# Patient Record
Sex: FEMALE | Race: OTHER | HISPANIC OR LATINO | Employment: FULL TIME | URBAN - METROPOLITAN AREA
[De-identification: names, ages, dates, MRNs, and addresses within clinical notes are randomized per-mention and may not be internally consistent; named-entity substitution may affect disease eponyms.]

---

## 2018-02-16 ENCOUNTER — OFFICE VISIT (OUTPATIENT)
Dept: FAMILY MEDICINE CLINIC | Facility: CLINIC | Age: 53
End: 2018-02-16
Payer: COMMERCIAL

## 2018-02-16 VITALS
SYSTOLIC BLOOD PRESSURE: 118 MMHG | TEMPERATURE: 98.3 F | OXYGEN SATURATION: 99 % | WEIGHT: 134 LBS | DIASTOLIC BLOOD PRESSURE: 70 MMHG | RESPIRATION RATE: 16 BRPM | BODY MASS INDEX: 21.53 KG/M2 | HEART RATE: 108 BPM | HEIGHT: 66 IN

## 2018-02-16 DIAGNOSIS — N30.00 ACUTE CYSTITIS WITHOUT HEMATURIA: ICD-10-CM

## 2018-02-16 DIAGNOSIS — R30.0 DYSURIA: Primary | ICD-10-CM

## 2018-02-16 PROBLEM — N39.0 UTI (URINARY TRACT INFECTION): Status: ACTIVE | Noted: 2018-02-16

## 2018-02-16 PROBLEM — N94.9 VAGINAL LUMP: Status: ACTIVE | Noted: 2018-02-16

## 2018-02-16 LAB
BACTERIA UR QL AUTO: ABNORMAL /HPF
BILIRUB UR QL STRIP: NEGATIVE
CLARITY UR: ABNORMAL
COLOR UR: YELLOW
GLUCOSE UR STRIP-MCNC: NEGATIVE MG/DL
HGB UR QL STRIP.AUTO: ABNORMAL
KETONES UR STRIP-MCNC: NEGATIVE MG/DL
LEUKOCYTE ESTERASE UR QL STRIP: ABNORMAL
NITRITE UR QL STRIP: NEGATIVE
NON-SQ EPI CELLS URNS QL MICRO: ABNORMAL /HPF
PH UR STRIP.AUTO: 5.5 [PH] (ref 4.5–8)
PROT UR STRIP-MCNC: ABNORMAL MG/DL
RBC #/AREA URNS AUTO: ABNORMAL /HPF
SL AMB  POCT GLUCOSE, UA: ABNORMAL
SL AMB LEUKOCYTE ESTERASE,UA: ABNORMAL
SL AMB POCT BILIRUBIN,UA: ABNORMAL
SL AMB POCT BLOOD,UA: ABNORMAL
SL AMB POCT CLARITY,UA: ABNORMAL
SL AMB POCT COLOR,UA: YELLOW
SL AMB POCT KETONES,UA: ABNORMAL
SL AMB POCT NITRITE,UA: ABNORMAL
SL AMB POCT PH,UA: 6
SL AMB POCT SPECIFIC GRAVITY,UA: 1.03
SL AMB POCT URINE PROTEIN: ABNORMAL
SL AMB POCT UROBILINOGEN: ABNORMAL
SP GR UR STRIP.AUTO: 1.02 (ref 1–1.03)
UROBILINOGEN UR QL STRIP.AUTO: 0.2 E.U./DL
WBC #/AREA URNS AUTO: ABNORMAL /HPF

## 2018-02-16 PROCEDURE — 81001 URINALYSIS AUTO W/SCOPE: CPT | Performed by: FAMILY MEDICINE

## 2018-02-16 PROCEDURE — 81002 URINALYSIS NONAUTO W/O SCOPE: CPT | Performed by: FAMILY MEDICINE

## 2018-02-16 PROCEDURE — 99214 OFFICE O/P EST MOD 30 MIN: CPT | Performed by: FAMILY MEDICINE

## 2018-02-16 PROCEDURE — 87086 URINE CULTURE/COLONY COUNT: CPT | Performed by: FAMILY MEDICINE

## 2018-02-16 RX ORDER — GLUCOSAMINE HCL 500 MG
TABLET ORAL
COMMUNITY
Start: 2016-05-11 | End: 2018-02-21 | Stop reason: ALTCHOICE

## 2018-02-16 RX ORDER — IBUPROFEN 800 MG/1
800 TABLET ORAL EVERY 6 HOURS PRN
COMMUNITY
End: 2018-03-20 | Stop reason: HOSPADM

## 2018-02-16 RX ORDER — NAPROXEN SODIUM 220 MG
TABLET ORAL
COMMUNITY
End: 2018-02-21 | Stop reason: ALTCHOICE

## 2018-02-16 RX ORDER — FAMOTIDINE 20 MG/1
TABLET, FILM COATED ORAL
COMMUNITY
Start: 2017-12-12 | End: 2018-03-20 | Stop reason: HOSPADM

## 2018-02-16 RX ORDER — CIPROFLOXACIN 500 MG/1
500 TABLET, FILM COATED ORAL EVERY 12 HOURS SCHEDULED
Qty: 14 TABLET | Refills: 0 | Status: SHIPPED | OUTPATIENT
Start: 2018-02-16 | End: 2018-02-23

## 2018-02-16 RX ORDER — MULTIVITAMIN
TABLET ORAL
COMMUNITY
End: 2018-02-21 | Stop reason: ALTCHOICE

## 2018-02-16 NOTE — PROGRESS NOTES
Assessment/Plan:    1 UTI - urine dipstick is positive for leukocytes 3 +, positive for blood  Will send urine for U/A and urine culture  Start Cipro 500 mg twice daily for 1 week  Recommended to increase fluid intake  2 Vaginal lump - r/o abscess versus cyst   Patient will start Abx  Recommended warm sitz bath  Will reevaluate next week and refer to gynecologist if no improvement  Diagnoses and all orders for this visit:    Dysuria  -     POCT urine dip  -     UA w Reflex to Microscopic w Reflex to Culture - Clinic Collect; Future          Subjective:      Patient ID: Supa Velazco is a 47 y o  female  HPI     Patient presents to the office complaining urinary frequency, discomfort with urination started 5 days ago  She increased fluid intake with some improvement in symptoms  Denies blood in urine  No back pain, no abdominal pain  No fever or chills  C/o lump  in vaginal area which she developed 5 days ago  Denies vaginal spotting or bleeding  Patient has not been seen by gynecologist recently  The following portions of the patient's history were reviewed and updated as appropriate: allergies, current medications, past family history, past medical history, past social history, past surgical history and problem list     Review of Systems   Constitutional: Negative for activity change, appetite change, chills, fatigue and fever  HENT: Negative  Respiratory: Negative for cough, chest tightness, shortness of breath and wheezing  Cardiovascular: Negative for chest pain, palpitations and leg swelling  Gastrointestinal: Positive for constipation (occasional )  Negative for abdominal distention, abdominal pain, blood in stool, diarrhea, nausea, rectal pain and vomiting  Genitourinary: Positive for dysuria and frequency  Negative for difficulty urinating, hematuria, vaginal bleeding and vaginal discharge          Vaginal lump   Musculoskeletal: Positive for arthralgias (L knee pain)  Negative for gait problem, joint swelling and myalgias  Skin: Negative for rash and wound  Neurological: Negative for dizziness and headaches  Hematological: Negative  Psychiatric/Behavioral: Negative  Objective:      /70 (BP Location: Left arm, Patient Position: Sitting, Cuff Size: Adult)   Pulse (!) 108   Temp 98 3 °F (36 8 °C) (Tympanic)   Resp 16   Ht 5' 5 5" (1 664 m)   Wt 60 8 kg (134 lb)   SpO2 99%   BMI 21 96 kg/m²          Physical Exam   Constitutional: She appears well-developed and well-nourished  Cardiovascular: Normal rate, regular rhythm and normal heart sounds  No murmur heard  Pulmonary/Chest: Effort normal and breath sounds normal  She has no wheezes  She has no rales  Abdominal: Soft  Bowel sounds are normal  There is no tenderness  Genitourinary: No vaginal discharge found  Genitourinary Comments: Palpable tender lump 1 5 x 1 5 cm in R lower vaginal area  No drainage  Musculoskeletal: Normal range of motion  She exhibits no edema or deformity  Skin: Skin is warm and dry  No rash noted  Psychiatric: She has a normal mood and affect  Nursing note and vitals reviewed

## 2018-02-18 LAB — BACTERIA UR CULT: ABNORMAL

## 2018-02-20 ENCOUNTER — OFFICE VISIT (OUTPATIENT)
Dept: FAMILY MEDICINE CLINIC | Facility: CLINIC | Age: 53
End: 2018-02-20
Payer: COMMERCIAL

## 2018-02-20 VITALS
DIASTOLIC BLOOD PRESSURE: 60 MMHG | RESPIRATION RATE: 16 BRPM | HEIGHT: 66 IN | WEIGHT: 135.8 LBS | SYSTOLIC BLOOD PRESSURE: 102 MMHG | TEMPERATURE: 98 F | HEART RATE: 60 BPM | BODY MASS INDEX: 21.83 KG/M2

## 2018-02-20 DIAGNOSIS — N94.9 VAGINAL LUMP: ICD-10-CM

## 2018-02-20 DIAGNOSIS — N30.00 ACUTE CYSTITIS WITHOUT HEMATURIA: Primary | ICD-10-CM

## 2018-02-20 PROCEDURE — 99213 OFFICE O/P EST LOW 20 MIN: CPT | Performed by: FAMILY MEDICINE

## 2018-02-20 PROCEDURE — 3008F BODY MASS INDEX DOCD: CPT | Performed by: FAMILY MEDICINE

## 2018-02-20 NOTE — ASSESSMENT & PLAN NOTE
R/o vaginal cyst   Referred to gynecologist for further evaluation  Appointment scheduled with gynecology for tomorrow

## 2018-02-20 NOTE — PROGRESS NOTES
Assessment/Plan:      UTI (urinary tract infection)  Symptoms improved  Complete Abx with Cipro as prescribed  Vaginal lump  R/o vaginal cyst   Referred to gynecologist for further evaluation  Appointment scheduled with gynecology for tomorrow  Diagnoses and all orders for this visit:    Acute cystitis without hematuria    Vaginal lump  -     Ambulatory referral to Gynecology; Future        Subjective:      Patient ID: Veronica Short is a 47 y o  female  HPI     Patient presents for follow-up for UTI, vaginal lump  She was seen in the office on February 16, 2018  She was started on Ciprofloxacin 500 mg twice daily for 1 week  Patient has 5 more tablets to finished antibiotic therapy  Denies urinary symptoms  No blood in urine  No vaginal discharge or spotting  Patient still feels a lump in the right vaginal area  Denies fever or chills    The following portions of the patient's history were reviewed and updated as appropriate: allergies, current medications, past family history, past medical history, past social history, past surgical history and problem list     Review of Systems   Constitutional: Negative for activity change, chills, fatigue and fever  Respiratory: Negative for cough and shortness of breath  Cardiovascular: Negative for chest pain, palpitations and leg swelling  Gastrointestinal: Negative for abdominal pain, blood in stool, constipation, diarrhea, nausea and vomiting  Genitourinary: Negative for difficulty urinating, dysuria, frequency, hematuria, vaginal bleeding and vaginal discharge  Lump in R vaginal area   Musculoskeletal: Positive for arthralgias (L knee pain)  Negative for joint swelling  Skin: Negative for rash and wound  Neurological: Negative for dizziness and headaches  Hematological: Negative  Psychiatric/Behavioral: Negative            Objective:      /60 (BP Location: Left arm, Patient Position: Sitting, Cuff Size: Adult) Pulse 60   Temp 98 °F (36 7 °C) (Tympanic)   Resp 16   Ht 5' 5 5" (1 664 m)   Wt 61 6 kg (135 lb 12 8 oz)   BMI 22 25 kg/m²          Physical Exam   Constitutional: She appears well-developed and well-nourished  Cardiovascular: Normal rate, regular rhythm and normal heart sounds  No murmur heard  Pulmonary/Chest: Effort normal and breath sounds normal    Abdominal: Soft  Bowel sounds are normal  There is no tenderness  Genitourinary:   Genitourinary Comments: Mildly tender lump 1 5 x 1 5 cm in R vaginal area  No drainage  Skin: Skin is warm and dry  No rash noted  Psychiatric: She has a normal mood and affect  Nursing note and vitals reviewed

## 2018-02-21 ENCOUNTER — OFFICE VISIT (OUTPATIENT)
Dept: OBGYN CLINIC | Facility: CLINIC | Age: 53
End: 2018-02-21
Payer: COMMERCIAL

## 2018-02-21 VITALS — BODY MASS INDEX: 22.45 KG/M2 | DIASTOLIC BLOOD PRESSURE: 68 MMHG | WEIGHT: 137 LBS | SYSTOLIC BLOOD PRESSURE: 114 MMHG

## 2018-02-21 DIAGNOSIS — N75.0 BARTHOLIN'S GLAND CYST: Primary | ICD-10-CM

## 2018-02-21 PROCEDURE — 99213 OFFICE O/P EST LOW 20 MIN: CPT | Performed by: PHYSICIAN ASSISTANT

## 2018-02-21 NOTE — ASSESSMENT & PLAN NOTE
Bartholin's cyst, recommend I&D, patient declined at this time, advised may get worse and more painful  Recommended warm sitz baths and soaks, RTO in 1 week for recheck, if not improved will I&D

## 2018-02-28 ENCOUNTER — OFFICE VISIT (OUTPATIENT)
Dept: OBGYN CLINIC | Facility: CLINIC | Age: 53
End: 2018-02-28
Payer: COMMERCIAL

## 2018-02-28 VITALS — BODY MASS INDEX: 22.45 KG/M2 | SYSTOLIC BLOOD PRESSURE: 116 MMHG | WEIGHT: 137 LBS | DIASTOLIC BLOOD PRESSURE: 72 MMHG

## 2018-02-28 DIAGNOSIS — N75.0 BARTHOLIN'S GLAND CYST: Primary | ICD-10-CM

## 2018-02-28 PROCEDURE — 99213 OFFICE O/P EST LOW 20 MIN: CPT | Performed by: PHYSICIAN ASSISTANT

## 2018-02-28 NOTE — PROGRESS NOTES
Assessment/Plan:  Problem List Items Addressed This Visit     Bartholin's gland cyst - Primary     Almost completely resolved, much improved  Continue daily warm sitz baths  If worsens/enlarges RTO for I&D, patient understands  Subjective:      Patient ID: Re Cifuentes is a 47 y o  female  Patient presents to office for 1 week follow up of R bartholin cyst   Patient states that since last visit has been doing daily warm sitz baths and has noticed great improvement in the swelling and it is no longer painful  Patient denies any bleeding, discharge, fever, or chills  She is doing well with no complaints  The following portions of the patient's history were reviewed and updated as appropriate: allergies, current medications, past family history, past medical history, past social history, past surgical history and problem list     Review of Systems   Constitutional: Negative for chills and fever  Genitourinary: Negative for vaginal bleeding, vaginal discharge and vaginal pain  Objective:  /72 (BP Location: Left arm, Patient Position: Sitting, Cuff Size: Standard)   Wt 62 1 kg (137 lb)   LMP  (LMP Unknown)   Breastfeeding? No   BMI 22 45 kg/m²      Physical Exam   Constitutional: She is oriented to person, place, and time  She appears well-developed and well-nourished  Genitourinary:       There is lesion on the left labia  There is no rash or tenderness on the left labia  Neurological: She is alert and oriented to person, place, and time  Psychiatric: She has a normal mood and affect  Nursing note and vitals reviewed

## 2018-02-28 NOTE — ASSESSMENT & PLAN NOTE
Almost completely resolved, much improved  Continue daily warm sitz baths  If worsens/enlarges RTO for I&D, patient understands

## 2018-03-19 ENCOUNTER — ANESTHESIA EVENT (OUTPATIENT)
Dept: PERIOP | Facility: HOSPITAL | Age: 53
End: 2018-03-19
Payer: COMMERCIAL

## 2018-03-20 ENCOUNTER — HOSPITAL ENCOUNTER (OUTPATIENT)
Facility: HOSPITAL | Age: 53
Setting detail: OUTPATIENT SURGERY
Discharge: HOME | End: 2018-03-20
Attending: ORTHOPAEDIC SURGERY | Admitting: ORTHOPAEDIC SURGERY
Payer: COMMERCIAL

## 2018-03-20 ENCOUNTER — ANESTHESIA (OUTPATIENT)
Dept: PERIOP | Facility: HOSPITAL | Age: 53
End: 2018-03-20
Payer: COMMERCIAL

## 2018-03-20 VITALS
HEIGHT: 61 IN | OXYGEN SATURATION: 100 % | DIASTOLIC BLOOD PRESSURE: 64 MMHG | TEMPERATURE: 97.5 F | WEIGHT: 130 LBS | RESPIRATION RATE: 16 BRPM | SYSTOLIC BLOOD PRESSURE: 124 MMHG | HEART RATE: 94 BPM | BODY MASS INDEX: 24.55 KG/M2

## 2018-03-20 DIAGNOSIS — M17.12 PRIMARY OSTEOARTHRITIS OF LEFT KNEE: Primary | ICD-10-CM

## 2018-03-20 RX ORDER — CHLORHEXIDINE GLUCONATE 4 G/100ML
SOLUTION TOPICAL DAILY PRN
Status: DISCONTINUED | OUTPATIENT
Start: 2018-03-20 | End: 2018-03-20 | Stop reason: HOSPADM

## 2018-03-20 RX ORDER — LIDOCAINE HYDROCHLORIDE 10 MG/ML
INJECTION, SOLUTION INFILTRATION; PERINEURAL AS NEEDED
Status: DISCONTINUED | OUTPATIENT
Start: 2018-03-20 | End: 2018-03-20 | Stop reason: HOSPADM

## 2018-03-20 RX ORDER — ONDANSETRON 2 MG/ML
4 INJECTION INTRAMUSCULAR; INTRAVENOUS EVERY 6 HOURS PRN
Status: DISCONTINUED | OUTPATIENT
Start: 2018-03-20 | End: 2018-03-20 | Stop reason: HOSPADM

## 2018-03-20 RX ORDER — MIDAZOLAM HYDROCHLORIDE 1 MG/ML
INJECTION INTRAMUSCULAR; INTRAVENOUS AS NEEDED
Status: DISCONTINUED | OUTPATIENT
Start: 2018-03-20 | End: 2018-03-20 | Stop reason: SURG

## 2018-03-20 RX ORDER — SODIUM CHLORIDE 9 MG/ML
75 INJECTION, SOLUTION INTRAVENOUS CONTINUOUS
Status: DISCONTINUED | OUTPATIENT
Start: 2018-03-20 | End: 2018-03-20 | Stop reason: HOSPADM

## 2018-03-20 RX ORDER — FENTANYL CITRATE 50 UG/ML
INJECTION, SOLUTION INTRAMUSCULAR; INTRAVENOUS AS NEEDED
Status: DISCONTINUED | OUTPATIENT
Start: 2018-03-20 | End: 2018-03-20 | Stop reason: SURG

## 2018-03-20 RX ORDER — ONDANSETRON 2 MG/ML
INJECTION INTRAMUSCULAR; INTRAVENOUS AS NEEDED
Status: DISCONTINUED | OUTPATIENT
Start: 2018-03-20 | End: 2018-03-20 | Stop reason: SURG

## 2018-03-20 RX ORDER — FENTANYL CITRATE/PF 50 MCG/ML
50 SYRINGE (ML) INJECTION
Status: DISCONTINUED | OUTPATIENT
Start: 2018-03-20 | End: 2018-03-20 | Stop reason: HOSPADM

## 2018-03-20 RX ORDER — DEXAMETHASONE SODIUM PHOSPHATE 4 MG/ML
INJECTION, SOLUTION INTRA-ARTICULAR; INTRALESIONAL; INTRAMUSCULAR; INTRAVENOUS; SOFT TISSUE AS NEEDED
Status: DISCONTINUED | OUTPATIENT
Start: 2018-03-20 | End: 2018-03-20 | Stop reason: SURG

## 2018-03-20 RX ORDER — BUPIVACAINE HYDROCHLORIDE 5 MG/ML
INJECTION, SOLUTION PERINEURAL AS NEEDED
Status: DISCONTINUED | OUTPATIENT
Start: 2018-03-20 | End: 2018-03-20 | Stop reason: HOSPADM

## 2018-03-20 RX ORDER — EPHEDRINE SULFATE 50 MG/ML
INJECTION, SOLUTION INTRAVENOUS AS NEEDED
Status: DISCONTINUED | OUTPATIENT
Start: 2018-03-20 | End: 2018-03-20 | Stop reason: SURG

## 2018-03-20 RX ORDER — SODIUM CHLORIDE 9 MG/ML
125 INJECTION, SOLUTION INTRAVENOUS CONTINUOUS
Status: DISCONTINUED | OUTPATIENT
Start: 2018-03-20 | End: 2018-03-20 | Stop reason: HOSPADM

## 2018-03-20 RX ORDER — ONDANSETRON 2 MG/ML
4 INJECTION INTRAMUSCULAR; INTRAVENOUS ONCE AS NEEDED
Status: DISCONTINUED | OUTPATIENT
Start: 2018-03-20 | End: 2018-03-20 | Stop reason: HOSPADM

## 2018-03-20 RX ORDER — KETOROLAC TROMETHAMINE 30 MG/ML
INJECTION, SOLUTION INTRAMUSCULAR; INTRAVENOUS AS NEEDED
Status: DISCONTINUED | OUTPATIENT
Start: 2018-03-20 | End: 2018-03-20 | Stop reason: SURG

## 2018-03-20 RX ORDER — BUPIVACAINE HYDROCHLORIDE AND EPINEPHRINE 5; 5 MG/ML; UG/ML
INJECTION, SOLUTION PERINEURAL AS NEEDED
Status: DISCONTINUED | OUTPATIENT
Start: 2018-03-20 | End: 2018-03-20 | Stop reason: HOSPADM

## 2018-03-20 RX ORDER — HYDROCODONE BITARTRATE AND ACETAMINOPHEN 5; 325 MG/1; MG/1
1 TABLET ORAL EVERY 6 HOURS PRN
Refills: 0
Start: 2018-03-20 | End: 2018-03-30

## 2018-03-20 RX ORDER — PROPOFOL 10 MG/ML
INJECTION, EMULSION INTRAVENOUS AS NEEDED
Status: DISCONTINUED | OUTPATIENT
Start: 2018-03-20 | End: 2018-03-20 | Stop reason: SURG

## 2018-03-20 RX ADMIN — ONDANSETRON HYDROCHLORIDE 4 MG: 2 INJECTION, SOLUTION INTRAVENOUS at 16:13

## 2018-03-20 RX ADMIN — PROPOFOL 170 MG: 10 INJECTION, EMULSION INTRAVENOUS at 15:57

## 2018-03-20 RX ADMIN — FENTANYL CITRATE 25 MCG: 50 INJECTION, SOLUTION INTRAMUSCULAR; INTRAVENOUS at 16:07

## 2018-03-20 RX ADMIN — FENTANYL CITRATE 50 MCG: 50 INJECTION, SOLUTION INTRAMUSCULAR; INTRAVENOUS at 16:46

## 2018-03-20 RX ADMIN — EPHEDRINE SULFATE 5 MG: 50 INJECTION, SOLUTION INTRAMUSCULAR; INTRAVENOUS; SUBCUTANEOUS at 16:04

## 2018-03-20 RX ADMIN — SODIUM CHLORIDE 125 ML/HR: 0.9 INJECTION, SOLUTION INTRAVENOUS at 12:50

## 2018-03-20 RX ADMIN — LIDOCAINE HYDROCHLORIDE 60 MG: 20 INJECTION, SOLUTION INTRAVENOUS at 15:57

## 2018-03-20 RX ADMIN — EPHEDRINE SULFATE 5 MG: 50 INJECTION, SOLUTION INTRAMUSCULAR; INTRAVENOUS; SUBCUTANEOUS at 16:14

## 2018-03-20 RX ADMIN — SODIUM CHLORIDE: 0.9 INJECTION, SOLUTION INTRAVENOUS at 16:47

## 2018-03-20 RX ADMIN — MIDAZOLAM HYDROCHLORIDE 2 MG: 1 INJECTION, SOLUTION INTRAMUSCULAR; INTRAVENOUS at 15:52

## 2018-03-20 RX ADMIN — KETOROLAC TROMETHAMINE 30 MG: 30 INJECTION, SOLUTION INTRAMUSCULAR at 16:46

## 2018-03-20 RX ADMIN — SODIUM CHLORIDE 125 ML/HR: 0.9 INJECTION, SOLUTION INTRAVENOUS at 15:40

## 2018-03-20 RX ADMIN — FENTANYL CITRATE 25 MCG: 50 INJECTION, SOLUTION INTRAMUSCULAR; INTRAVENOUS at 15:56

## 2018-03-20 RX ADMIN — DEXAMETHASONE SODIUM PHOSPHATE 4 MG: 4 INJECTION, SOLUTION INTRAMUSCULAR; INTRAVENOUS at 16:13

## 2018-03-20 RX ADMIN — CEFAZOLIN SODIUM 2000 MG: 2 SOLUTION INTRAVENOUS at 15:54

## 2018-03-20 NOTE — ANESTHESIA PREPROCEDURE EVALUATION
Review of Systems/Medical History  Patient summary reviewed        Cardiovascular  Negative cardio ROS    Pulmonary  Negative pulmonary ROS Pneumonia,   Comment: History of      GI/Hepatic  Negative GI/hepatic ROS          Negative  ROS        Endo/Other  Negative endo/other ROS      GYN  Negative gynecology ROS          Hematology  Negative hematology ROS      Musculoskeletal  Negative musculoskeletal ROS   Arthritis     Neurology  Negative neurology ROS      Psychology   Negative psychology ROS              Physical Exam    Airway    Mallampati score: II  TM Distance: >3 FB  Neck ROM: full     Dental   No notable dental hx     Cardiovascular  Comment: Negative ROS, Rhythm: regular, Rate: normal, Cardiovascular exam normal    Pulmonary  Pulmonary exam normal Breath sounds clear to auscultation,     Other Findings        Anesthesia Plan  ASA Score- 2     Anesthesia Type- general with ASA Monitors  Additional Monitors:   Airway Plan: LMA  Plan Factors-    Induction- intravenous  Postoperative Plan-     Informed Consent- Anesthetic plan and risks discussed with patient

## 2018-03-20 NOTE — OP NOTE
OPERATIVE REPORT  PATIENT NAME: Jacqui Mora    :  1965  MRN: [id number]  Pt Location: [facility] OR ROOM 02    SURGERY DATE: 3/20/2018    Surgeon(s) and Role:     * Michelle Sanders MD - Primary  Asst:Shane Donovan CST    Preop Diagnosis:  Post-traumatic medial meniscus  of left knee [M17 32]  Pain in left knee [M25 562]  Complex intrasubstance tear of middle third of the medial meniscus, current injury, left knee, subsequent encounter [S83 232D]    Post-Op Diagnosis Codes:     * complex intrasubsance teaqr of middle third of the medial meniscus  [M17 32]     * Pain in left knee [M25 562]     * Complex tear of medial meniscus, current injury, left knee, subsequent encounter [S83 232D]    Procedure(s) (LRB):  ARTHROSCOPY KNEE; partial medial menisectomy;  WITH INTRA [address] (Left)  DATE OF PROCEDURE: 3/20/2018    PREOPERATIVE DIAGNOSIS: Internal derangement, left knee  POSTOPERATIVE DIAGNOSES:    1  Complex tear, non-repairable middle third of the  medial meniscus  2  No  Tear  lateral meniscus  3  No  chondromalacia, medial femoral condyle with no  reciprocal changes on  the medial tibial plateau  4  No chondromalacia, lateral  tibial plateau  5  No  chondromalacia of the patella or the the trochlear  notch  SURGEON: Jake Rajan MD     ASSISTANT: Roxie Oconnell CST    PROCEDURE: left knee arthroscopy, partial medial menisectomy 20%, no chondroplasty, with intra articular nerve block  DESCRIPTION OF PROCEDURE:   After identification of the patient in the ASU, identifying the left knee as the proper operative site, discussing the benefits and risks of surgery, we proceeded to the operating room  In the operating room, 2 timeouts identified the patient as Kate Bojorquez and the left  knee as the proper operative site  Supine on the OR table, IV antibiotics and IV sedation from the Department of Anesthesia, the left lower extremity was placed in a leg rose   No tourniquet was used  The leg was washed with Chloroxylenol for 8 minutes preceded by a 8-minute chloroxylenol scrub at home this morning  This was followed by a chlorhexidine prep preceded by a chlorhexidine wipe in ASU  The knee was numbed with Marcaine 50 ml of 0 5% with epinephrine preoperative and another 20 cc at the completion of the procedure  Sterile draping was then performed  The incisional areas was numbed with Lidocaine 1% plain  Inferolateral parapatellar incision was used for introduction of the arthroscope and inferomedial parapatellar incision was used for introduction of all instrumentation using the blunt probe  The knee was inspected thoroughly and benignly  There was a normal ligament exam normal inspection of the anterior and posterior cruciate ligaments via the arthroscope and probing  Normal LCL and MCL exam on stress testing  There was a complex tear middle third of the medial meniscus, not repairable, that extended from the inner edge to the red-white area  Resection was performed with basket forceps and a meniscal shaver, fashioning a smoother edge, smooth transitional zone and smooth and stable rim  There was no ttear of middle third of the lateral meniscus  There was no  fraying and no delamination of the articular cartilage in the medial femoral condyle  There were no reciprocal changes on the tibia  The patellofemoral joint and trochlear notch with no chondromalacia / no DJD  The synovium appeared normal  The knee was irrigated copiously  All instrumentation and debris were removed and 3-0 nylon sutures closed each incision  A soft, sterile bulky dressing was applied  The patient was transferred to PACU  There were no problems and this was a routine procedure  Neurovascular intact        Specimen(s):  * No specimens in log *    Estimated Blood Loss:   Minimal    Drains:       Anesthesia Type:   Choice    Operative Indications:  Post-traumatic osteoarthritis of left knee [M17 32]  Pain in left knee [M25 562]  Complex tear of medial meniscus, current injury, left knee, subsequent encounter [S83 232D]        Operative Findings:    Complex tear middle third medial meniscus    Complications:   None  none  Procedure and Technique:  Arthroscopy / intra-articular nerve block / partial medial menisectomy   I was present for the entire procedure  DATE OF PROCEDURE: 3/20/2018    PREOPERATIVE DIAGNOSIS: Internal derangement, left knee  POSTOPERATIVE DIAGNOSES:    1  Complex tear, non-repairable, posterior horn medial meniscus  2  No tear lateral meniscus  3  No chondromalacia, medial femoral condyle with no reciprocal changes on  the medial tibial plateau  4  There was no chondromalacia of the medial or lateral  tibial plateau  5   No chondromalacia of the patella with no reciprocal changes on the trochlear notch  SURGEON: Suzanne Rajan MD     ASSISTANT: Ne Vanessa CST    PROCEDURE: left knee arthroscopy, left knee partial medial menisectomy, with intra articular nerve block  DESCRIPTION OF PROCEDURE:   After identification of the patient in the ASU, identifying the left knee as the proper operative site, discussing the benefits and risks of surgery, we proceeded to the operating room  In the operating room, 2 timeouts identified the patient as Brady Khalil and the left knee as the proper operative site  Supine on the OR table, IV antibiotics and IV sedation from the Department of Anesthesia, the left lower extremity was placed in a leg rose  No tourniquet was used  The leg was washed with Chloroxylenol for 8 minutes preceded by a 8-minute chloroxylenol scrub at home this morning  This was followed by a chlorhexidine prep preceded by a chlorhexidine wipe in ASU  The knee was numbed with Marcaine 50 ml of 0 5% with epinephrine  Sterile draping was then performed  The incisional area was numbed with Lidocaine 1% plain  Inferolateral parapatellar incision was used for introduction of the arthroscope and inferomedial parapatellar incision was used for introduction of all instrumentation  The knee was inspected thoroughly and benignly  There was a normal ligament exam normal inspection of the anterior and posterior cruciate ligaments via the arthroscope and probing  There was a complex tear posterior on the medial meniscus, not repairable, that extended from the inner edge to the red area  Resection was performed with basket forceps and a meniscal shaver, fashioning a smoother edge, smooth transitional zone and smooth and stable rim  rim  The same technique was used for the tear of middle third degenerative tear of the lateral meniscus  There was some fraying and early delamination of the articular cartilage in the medial femoral condyle  I did perform a gentle chondroplasty using the meniscotome in reverse and carefully protecting the healthier portions of the articular cartilage  There were no reciprocal changes on the tibia  The patellofemoral joint had end-stage degenerative arthritis, subchondral exposed bone that was sclerotic  The synovium appeared normal  The knee was irrigated copiously  All instrumentation and debris were removed and 3-0 nylon sutures closed each incision  A soft, sterile bulky dressing was applied  The patient was transferred to PACU  There were no problems and this was a routine procedure        Patient Disposition:  hemodynamically stable    SIGNATURE: Braydon Luther MD  DATE: March 20, 2018  TIME: 4:45 PM

## 2018-03-20 NOTE — ANESTHESIA POSTPROCEDURE EVALUATION
Post-Op Assessment Note      CV Status:  Stable    Mental Status:  Alert and awake    Hydration Status:  Euvolemic    PONV Controlled:  Controlled    Airway Patency:  Patent    Post Op Vitals Reviewed: Yes          Staff: Anesthesiologist           /66 (03/20/18 1736)    Temp 97 5 °F (36 4 °C) (03/20/18 1736)    Pulse 94 (03/20/18 1736)   Resp 14 (03/20/18 1736)    SpO2 98 % (03/20/18 1736)

## 2018-03-20 NOTE — DISCHARGE INSTRUCTIONS
POST OPERATIVE KNEE INSTRUCTIONS - I       The principle surgical findings in your knee were:    1  Complex tear, non-repairable middle third of the  medial meniscus  2  No  Tear  lateral meniscus  3  No  chondromalacia, medial femoral condyle with no  reciprocal changes on  the medial tibial plateau  4  No chondromalacia, lateral  tibial plateau  5  No  chondromalacia of the patella or the the trochlear  notch   The following corrective procedures were performed:    1  Diagnostic arthroscopy of the knee    2  Arthroscopic partial medial meniscectomy    3  Arthroscopic debridement      Follow-up:     You already have will need an appointment scheduled  Please call [phone] to confirm  General Instructions:     Apply an ice pack to your knee for the next 12-24 hours   If crutches were prescribed, you should limit weight bearing at all times as instructed  Keep knee moving the first day and bend as much as pain allows   Take it easy at least 24 hours  Do not drive for 3 - 5 days  You may move about, but stay around home   If you have an upset stomach, take only cool, clear liquids such as Gatorade jello or Ginger ale  If nausea persists more than 24 hours, notify office   Low- grade temperature is not uncommon after surgery  However, if your temperature exceeds 101o, please notify my office   The Novacaine in your knee will keep your knee numb until tonight  The medicine will wear off and you will feel pain for several days up to a week  This is normal after arthroscopic surgery   On the day after surgery, you may walk normally and bend the knee normally  You may continue using a cane or crutches to minimize any discomfort the first 24 to 48 hours   It is normal to have swelling and discomfort in the knee for several days to a week after arthroscopic surgery     You should take one enteric- coated aspirin in the morning and one tablet at night to help minimize the chances of developing phlebitis (clots in the veins)  This should also be taken with food or a glass of milk to avoid stomach upset  Examples of enteric coated aspirin are Ecotrin, Ascriptin or Bufferin 325 mg dose  DO NOT TAKE this if you are known to be allergic to it or have a prior history of stomach ulcer disease  NOTE:  If after taking the enteric coated aspirin you develop any pain in the stomach, nausea, vomiting or stomach irritation, you should stop the medication immediately because it may cause stomach ulcers  If you continue taking this medication while you are having pain in the stomach or nausea or stomach cramps, an ulcer could develop   To reduce pain and swelling, place several pillows under your knee for the first 24 to 48 hours  The knee should be elevated above the heart  Ice should be applied to the knee during this period and this will help decrease discomfort and swelling  Use two small garbage bags (one always leaks) and fill about one-third full of crushed ice  Apply to the knee for 30 minutes every two hours   Any prescription you received before or after surgery should be filled immediately, and taken according to directions on the label  Taking the medication with food or with a glass of milk will avoid stomach upset   Weight bearing as per instructions from surgeon  Exercises:     Begin doing gentle exercises right away  Exercising will reduce the swelling, increase motion and prevent muscle weakness  The exercise has been explained to you in the handout that you received in the office  The following exercises should be performed during the first week and a half following surgery  The advanced knee exercise program will be started when you are seen in the office  1  Quad sets:  Straighten as straight as possible and then clench the thigh muscles tightly  Keep the muscles clenched tightly to the slow count of three and then relax    Repeat this exercise 10-30 times every hour  2  Straight leg raising:  With the knee held straight and the quadriceps muscle contracted, raise your leg up six to eight inches and hold it to the slow count of three  Repeat this exercise 10 - 30 times every hour  3  Range of motion:  You should start bending your knee to the point of pain and increase bending it until full motion is obtained  Repeat this exercise 10 - 30 times every hour,   For the first 48 hours inhale deeply and hold your breath for 3 seconds; exhale completely  Repeat 10 times, 4 times daily   If you smoke, avoid cigarettes for 48 hours    Bandages:     Your bandage may show blood -stains within 1 - 12 hours  This is mostly fluid that was used to irrigate your knee, slightly tinged with blood  It is no cause for concern  However, if your bandage becomes saturated, notify my office right away   You may bathe or shower after 48 hours when you have removed the bulky dressings and applied band-aids to the small skin incision  Work:     Plan to take three or four days off from work  You can resume work when you are comfortable (this can be a week or more depending on the type of work you do )  Do not walk or stand for excessive periods  Do not operate heavy machinery that requires pedals  IMPORTANT     Infections in arthroscopic surgery are rare but can occur  If the knee becomes severely swollen, red, begins to drain or you run a fever, call the office immediately   Report any complications to my office immediately  This includes excessive bleeding, wound breakdown or signs of infection, calf or ankle swelling, or excessive pain  Also, call if you notice the extremity becoming cold, blue or numb   Eat a balanced diet and get ample rest              Exercises for Knee Rehabilitation  Dr Akanksha Taylor  General Instructions: How fast and how well you regain knee motion is directly related to your motivation and perseverance   Strong determination and tolerance for temporary discomfort will hasten your return to normal activities  Follow the exercise routine prescribed by Dr Marvin Fall  Gradually increase the frequency of the exercise as your knee becomes stronger  Initially you may require assistance, but you should soon be able to perform these exercises and stretching maneuvers on your own  Swimming is a good form of exercise once the incisions are well healed  Many of the following exercises can be more effectively performed with the aid of the water buoyancy  You may wish to establish the exercise pattern with your good knee then switch to your injured one  Additional Instructions:   1  Quadriceps Isometrics Strengthening   Sit on a flat surface with legs out straight  Tighten the    knee without moving the leg out of position  To get the idea of this exercise have someone their hand behind your knee (push against the hand attempting to flatten   your knee)  Relax and repeat slowly, holding the knee in the tightened position approximately two seconds each time  Repeat this exercise at least 25 times every hour that you are awake  This can also be performed at odd moments also such as in your car while youre a passenger or, as youre a  and stopped at a stoplight or while youre sitting in a chair, etc    2  Straight Leg Raises   Lay on Your back with your legs out straight, knees unbent holding onto a solid object (bed frame or couch, etc) lift your heel slowly off the bed  Raise the leg as high as possible  Slowly lower the leg to the bed keeping the knee straight  Repeat ten times; perform three times a day  3  Knee Flexion Exercise   Raise the leg straight upward as in exercise # 2  Support the lower thigh just above the knee with hands clasped in back of the knee  Relax the knee muscles and let the weight of the leg bend the knee; then, with no additional aid straighten the knee to its previous position   Repeat, each time permitting the leg to bend further  4  Sitting Knee Flexion Exercise   Sit on side of bed with pillow under knees and legs dangling  Straighten injured leg, using foot of good leg for support, let it drop by gravity, then force it to bend, using other foot to exert pressure on top of the ankle to limits of pain tolerance; repeat  5  Movement with assistance from healthy leg   Sitting high on table or bed, let injured leg dangle with minimal or no support, and tighten hamstring muscles  Use opposite foot to gradually bend the injured leg by exerting pressure on the TOP of the ankle  6  Imaginary Bicycling   Perform bicycling-type exercises, lying on your back with your feet extended into the air  Imitate the movement as if you were on a bicycle and pedaling  Stretch your leg as far as you can straight into the air while at the same time bending the opposite leg as close to your chest as possible, letting gravity pull it toward your body  7  Bicycling   Use a stationary bicycle or a regular bicycle placed on jacks, with the seat placed in highest position  When you are able to make a full revolution comfortably, lower the seat  Increase the tension as you are able to lower the seat even more and have achieved 110 degrees of bend in your leg  Gradually increase the duration of time on the bike  When you can cycle comfortably for 20 minutes you may increase resistance  8  Hamstring and Knee Stretches in Prone Position   Lie on your stomach and bend the injured knee by lifting foot from table  You may force the knee to bend by pulling a strap or towel looped over your foot  Gently pull on the strap until you feel pressure in your knee and hold for 2-5 seconds  Then, gently allow the leg to move back toward the ground with the assistance of gravity and the use of the strap (preventing it from falling to quickly)

## (undated) DEVICE — INTENDED FOR TISSUE SEPARATION, AND OTHER PROCEDURES THAT REQUIRE A SHARP SURGICAL BLADE TO PUNCTURE OR CUT.: Brand: BARD-PARKER ® CARBON RIB-BACK BLADES

## (undated) DEVICE — GLOVE SRG BIOGEL 7.5

## (undated) DEVICE — BLADE SHAVER EXCALIBUR 4MM 13CM COOLCUT

## (undated) DEVICE — ACE WRAP 6 IN UNSTERILE

## (undated) DEVICE — PADDING CAST 6IN COTTON STRL

## (undated) DEVICE — TUBING ARTHROSCOPIC WAVE  MAIN PUMP

## (undated) DEVICE — GLOVE SRG BIOGEL 8

## (undated) DEVICE — PACK UNIVERSAL ARTHRSCOPY PBDS

## (undated) DEVICE — GLOVE INDICATOR PI UNDERGLOVE SZ 8 BLUE

## (undated) DEVICE — IMPERVIOUS STOCKINETTE: Brand: DEROYAL

## (undated) DEVICE — SYRINGE 50ML LL

## (undated) DEVICE — NEEDLE 18 G X 1 1/2

## (undated) DEVICE — GLOVE INDICATOR PI UNDERGLOVE SZ 7.5 BLUE

## (undated) DEVICE — GAUZE SPONGES,16 PLY: Brand: CURITY

## (undated) DEVICE — NEEDLE HYPO 22G X 1-1/2 IN

## (undated) DEVICE — COBAN 6 IN STERILE

## (undated) DEVICE — CHLORAPREP HI-LITE 26ML ORANGE

## (undated) DEVICE — MAYO STAND COVER: Brand: CONVERTORS

## (undated) DEVICE — ABDOMINAL PAD: Brand: DERMACEA

## (undated) DEVICE — WEBRIL 6 IN UNSTERILE

## (undated) DEVICE — SYRINGE 10ML LL CONTROL TOP

## (undated) DEVICE — ARTHROSCOPY FLOOR MAT

## (undated) DEVICE — OCCLUSIVE GAUZE STRIP,3% BISMUTH TRIBROMOPHENATE IN PETROLATUM BLEND: Brand: XEROFORM

## (undated) DEVICE — POV-IOD SWAB STICKS

## (undated) DEVICE — SUT ETHILON 3-0 PS-1 18 IN 1663G

## (undated) DEVICE — TUBING SUCTION 5MM X 12 FT

## (undated) DEVICE — PADDING CAST 4 IN  COTTON STRL